# Patient Record
Sex: FEMALE | Race: BLACK OR AFRICAN AMERICAN | NOT HISPANIC OR LATINO | Employment: OTHER | URBAN - METROPOLITAN AREA
[De-identification: names, ages, dates, MRNs, and addresses within clinical notes are randomized per-mention and may not be internally consistent; named-entity substitution may affect disease eponyms.]

---

## 2020-04-17 ENCOUNTER — NURSE TRIAGE (OUTPATIENT)
Dept: ADMINISTRATIVE | Facility: CLINIC | Age: 60
End: 2020-04-17

## 2020-04-18 NOTE — TELEPHONE ENCOUNTER
Call back. Spoke with Ora. She is not with the pt. Trying to reach her. Requested RN call back in 10 minutes.

## 2020-04-18 NOTE — TELEPHONE ENCOUNTER
Pt's aunt states her niece had called her with extreme back pain. Shavonne is not with the pt right now. RN explained she would need to be with the pt to continue with triage. She VU. She will go to the pt's home . RN will wanda her back in 20 minutes.

## 2020-04-18 NOTE — TELEPHONE ENCOUNTER
"Pt reported general body pains. Worst pain shoulders. Pain has bee npresent for 1.5 years. Pain is severe tonight. Advised to go to ED for evaluation. Aunt Shavonne will bring her to the ED.    Reason for Disposition   [1] SEVERE pain AND [2] not improved 2 hours after pain medicine    Additional Information   Negative: Passed out (i.e., lost consciousness, collapsed and was not responding)   Negative: Shock suspected (e.g., cold/pale/clammy skin, too weak to stand, low BP, rapid pulse)   Negative: [1] Similar pain previously AND [2] it was from "heart attack"   Negative: [1] Similar pain previously AND [2] it was from "angina" AND [3] not relieved by nitroglycerin   Negative: Sounds like a life-threatening emergency to the triager   Negative: Difficulty breathing or unusual sweating (e.g., sweating without exertion)   Negative: [1] Pain lasting > 5 minutes AND [2] pain also present in chest (Exception: pain is clearly made worse by movement)   Negative: [1] Age > 40 AND [2] no obvious cause AND [3] pain even when not moving the arm    (Exception: pain is clearly made worse by moving arm or bending neck)    Protocols used: ST SHOULDER PAIN-A-AH    "

## 2021-09-10 ENCOUNTER — PATIENT OUTREACH (OUTPATIENT)
Dept: ADMINISTRATIVE | Facility: HOSPITAL | Age: 61
End: 2021-09-10

## 2021-09-10 DIAGNOSIS — Z12.12 ENCOUNTER FOR COLORECTAL CANCER SCREENING: ICD-10-CM

## 2021-09-10 DIAGNOSIS — Z12.4 ENCOUNTER FOR PAPANICOLAOU SMEAR OF CERVIX WITH HUMAN PAPILLOMA VIRUS (HPV) DNA COTESTING: Primary | ICD-10-CM

## 2021-09-10 DIAGNOSIS — Z12.11 ENCOUNTER FOR COLORECTAL CANCER SCREENING: ICD-10-CM

## 2023-02-08 DIAGNOSIS — Z12.31 OTHER SCREENING MAMMOGRAM: ICD-10-CM

## 2023-07-01 PROBLEM — Z86.73 HISTORY OF STROKE: Status: ACTIVE | Noted: 2023-07-01

## 2023-07-01 PROBLEM — I10 PRIMARY HYPERTENSION: Status: ACTIVE | Noted: 2023-07-01

## 2023-07-06 PROBLEM — E55.9 VITAMIN D INSUFFICIENCY: Status: ACTIVE | Noted: 2023-07-06

## 2023-07-12 PROBLEM — I63.9 CEREBRAL INFARCT: Status: ACTIVE | Noted: 2023-07-12

## 2023-07-27 PROBLEM — I63.9 STROKE: Status: ACTIVE | Noted: 2023-07-27

## 2023-07-27 PROBLEM — F10.931 DELIRIUM TREMENS: Status: ACTIVE | Noted: 2023-07-27

## 2023-07-28 PROBLEM — F10.90 ALCOHOL USE DISORDER: Status: ACTIVE | Noted: 2023-07-28

## 2023-07-28 PROBLEM — E86.0 DEHYDRATION: Status: ACTIVE | Noted: 2023-07-28

## 2023-07-28 PROBLEM — F25.0 SCHIZOAFFECTIVE DISORDER, BIPOLAR TYPE: Status: ACTIVE | Noted: 2023-07-28

## 2023-07-28 PROBLEM — E87.20 LACTIC ACIDOSIS: Status: ACTIVE | Noted: 2023-07-28

## 2023-07-28 PROBLEM — I25.10 CAD (CORONARY ARTERY DISEASE): Status: ACTIVE | Noted: 2023-07-28

## 2023-07-28 PROBLEM — J96.02 ACUTE RESPIRATORY FAILURE WITH HYPOXIA AND HYPERCARBIA: Status: ACTIVE | Noted: 2023-07-28

## 2023-07-28 PROBLEM — G40.901 STATUS EPILEPTICUS: Status: ACTIVE | Noted: 2023-07-28

## 2023-07-28 PROBLEM — I63.9 STROKE: Status: RESOLVED | Noted: 2023-07-27 | Resolved: 2023-07-28

## 2023-07-28 PROBLEM — I95.9 HYPOTENSION: Status: ACTIVE | Noted: 2023-07-28

## 2023-07-28 PROBLEM — I10 ESSENTIAL HYPERTENSION: Status: ACTIVE | Noted: 2023-07-28

## 2023-07-28 PROBLEM — E87.6 HYPOKALEMIA: Status: ACTIVE | Noted: 2023-07-28

## 2023-07-28 PROBLEM — J96.01 ACUTE RESPIRATORY FAILURE WITH HYPOXIA AND HYPERCARBIA: Status: ACTIVE | Noted: 2023-07-28

## 2023-07-28 PROBLEM — F17.200 NICOTINE USE DISORDER: Status: ACTIVE | Noted: 2023-07-28

## 2023-07-31 PROBLEM — R56.9 SEIZURES: Status: ACTIVE | Noted: 2023-07-28

## 2023-07-31 PROBLEM — G40.909 SEIZURE DISORDER: Status: ACTIVE | Noted: 2023-07-31

## 2023-07-31 PROBLEM — I63.81 THALAMIC INFARCT, ACUTE: Status: ACTIVE | Noted: 2023-07-31

## 2023-08-01 PROBLEM — I95.9 HYPOTENSION: Status: RESOLVED | Noted: 2023-07-28 | Resolved: 2023-08-01

## 2023-08-01 PROBLEM — E86.0 DEHYDRATION: Status: RESOLVED | Noted: 2023-07-28 | Resolved: 2023-08-01

## 2023-08-01 PROBLEM — F05 ACUTE HYPERACTIVE DELIRIUM DUE TO ANOTHER MEDICAL CONDITION: Status: ACTIVE | Noted: 2023-08-01

## 2023-08-01 PROBLEM — J96.02 ACUTE RESPIRATORY FAILURE WITH HYPOXIA AND HYPERCARBIA: Status: RESOLVED | Noted: 2023-07-28 | Resolved: 2023-08-01

## 2023-08-01 PROBLEM — I50.22 CHRONIC SYSTOLIC HEART FAILURE: Status: ACTIVE | Noted: 2023-08-01

## 2023-08-01 PROBLEM — J96.01 ACUTE RESPIRATORY FAILURE WITH HYPOXIA AND HYPERCARBIA: Status: RESOLVED | Noted: 2023-07-28 | Resolved: 2023-08-01

## 2023-08-01 PROBLEM — E87.6 HYPOKALEMIA: Status: RESOLVED | Noted: 2023-07-28 | Resolved: 2023-08-01

## 2023-08-01 PROBLEM — R94.31 PROLONGED Q-T INTERVAL ON ECG: Status: ACTIVE | Noted: 2023-08-01

## 2023-08-02 PROBLEM — E87.20 LACTIC ACIDOSIS: Status: RESOLVED | Noted: 2023-07-28 | Resolved: 2023-08-02

## 2023-08-04 PROBLEM — F10.931 DELIRIUM TREMENS: Status: RESOLVED | Noted: 2023-07-27 | Resolved: 2023-08-04

## 2023-08-05 PROBLEM — E83.39 HYPOPHOSPHATEMIA: Status: ACTIVE | Noted: 2023-08-05

## 2023-08-06 PROBLEM — E83.39 HYPOPHOSPHATEMIA: Status: RESOLVED | Noted: 2023-08-05 | Resolved: 2023-08-06

## 2023-08-08 PROBLEM — I21.4 NSTEMI (NON-ST ELEVATED MYOCARDIAL INFARCTION): Status: ACTIVE | Noted: 2023-08-08

## 2023-08-08 PROBLEM — I48.11 LONGSTANDING PERSISTENT ATRIAL FIBRILLATION: Status: ACTIVE | Noted: 2023-08-08

## 2023-08-08 PROBLEM — G40.909 SEIZURE DISORDER: Status: RESOLVED | Noted: 2023-07-31 | Resolved: 2023-08-08

## 2023-08-11 PROBLEM — R94.31 PROLONGED Q-T INTERVAL ON ECG: Status: RESOLVED | Noted: 2023-08-01 | Resolved: 2023-08-11

## 2023-08-11 PROBLEM — I25.10 CORONARY ARTERY DISEASE INVOLVING NATIVE CORONARY ARTERY OF NATIVE HEART WITHOUT ANGINA PECTORIS: Status: RESOLVED | Noted: 2023-07-28 | Resolved: 2023-08-11

## 2023-08-11 PROBLEM — F23 ACUTE PSYCHOSIS: Status: ACTIVE | Noted: 2023-08-11

## 2023-08-11 PROBLEM — F05 ACUTE HYPERACTIVE DELIRIUM DUE TO ANOTHER MEDICAL CONDITION: Status: RESOLVED | Noted: 2023-08-01 | Resolved: 2023-08-11

## 2023-08-12 PROBLEM — T14.8XXA MULTIPLE WOUNDS OF SKIN: Status: ACTIVE | Noted: 2023-08-12

## 2023-08-12 PROBLEM — R05.9 COUGH: Status: ACTIVE | Noted: 2023-08-12

## 2023-08-13 PROBLEM — R13.10 SWALLOWING DYSFUNCTION: Status: ACTIVE | Noted: 2023-08-13

## 2023-08-15 PROBLEM — F25.9 SCHIZOAFFECTIVE DISORDER: Status: ACTIVE | Noted: 2023-07-28

## 2023-08-15 PROBLEM — L81.9 HYPOPIGMENTATION: Status: ACTIVE | Noted: 2023-08-15

## 2023-08-15 PROBLEM — R05.9 COUGH: Status: RESOLVED | Noted: 2023-08-12 | Resolved: 2023-08-15

## 2023-08-22 PROBLEM — I21.09 ACUTE MI ANTERIOR WALL SUBSEQUENT EPISODE CARE: Status: ACTIVE | Noted: 2023-08-22

## 2023-08-22 PROBLEM — G40.901 STATUS EPILEPTICUS: Status: ACTIVE | Noted: 2023-08-22

## 2023-08-29 PROBLEM — D64.9 NORMOCYTIC ANEMIA: Status: ACTIVE | Noted: 2023-08-29

## 2023-08-29 PROBLEM — D50.9 IRON DEFICIENCY ANEMIA: Status: ACTIVE | Noted: 2023-08-29

## 2023-09-14 PROBLEM — R94.31 ABNORMAL EKG: Status: ACTIVE | Noted: 2023-09-14

## 2023-09-14 PROBLEM — R79.89 ELEVATED TROPONIN LEVEL: Status: ACTIVE | Noted: 2023-09-14

## 2023-09-16 PROBLEM — I21.4 NSTEMI (NON-ST ELEVATED MYOCARDIAL INFARCTION): Status: RESOLVED | Noted: 2023-08-08 | Resolved: 2023-09-16

## 2023-09-16 PROBLEM — R94.31 ABNORMAL EKG: Status: RESOLVED | Noted: 2023-09-14 | Resolved: 2023-09-16

## 2023-09-16 PROBLEM — R79.89 ELEVATED TROPONIN LEVEL: Status: RESOLVED | Noted: 2023-09-14 | Resolved: 2023-09-16

## 2023-09-18 PROBLEM — F20.9 SCHIZOPHRENIA: Status: ACTIVE | Noted: 2023-09-18

## 2023-09-18 PROBLEM — G40.919 BREAKTHROUGH SEIZURE: Status: ACTIVE | Noted: 2023-07-28

## 2023-09-18 PROBLEM — F10.10 ALCOHOL ABUSE: Status: ACTIVE | Noted: 2023-09-18

## 2023-09-18 PROBLEM — I10 HYPERTENSION: Status: ACTIVE | Noted: 2023-09-18

## 2023-09-18 PROBLEM — I50.9 HEART FAILURE: Status: ACTIVE | Noted: 2023-09-18

## 2023-09-18 PROBLEM — E11.9 TYPE 2 DIABETES MELLITUS WITHOUT COMPLICATION: Status: ACTIVE | Noted: 2023-09-18

## 2023-09-19 PROBLEM — I50.9 HEART FAILURE: Status: RESOLVED | Noted: 2023-09-18 | Resolved: 2023-09-19

## 2023-09-19 PROBLEM — I10 HYPERTENSION: Status: RESOLVED | Noted: 2023-09-18 | Resolved: 2023-09-19

## 2023-09-19 PROBLEM — R50.9 FEVER: Status: ACTIVE | Noted: 2023-09-19

## 2023-09-20 PROBLEM — G93.40 ENCEPHALOPATHY: Status: ACTIVE | Noted: 2023-09-20

## 2023-09-23 PROBLEM — G40.909 EPILEPSY: Status: ACTIVE | Noted: 2023-07-28

## 2023-09-23 PROBLEM — G93.40 ENCEPHALOPATHY: Status: RESOLVED | Noted: 2023-09-20 | Resolved: 2023-09-23

## 2023-09-23 PROBLEM — R56.9 SEIZURE: Status: ACTIVE | Noted: 2023-09-23

## 2023-09-26 PROBLEM — R13.19 OTHER DYSPHAGIA: Status: ACTIVE | Noted: 2023-09-26

## 2023-09-27 PROBLEM — F10.10 ALCOHOL ABUSE: Status: RESOLVED | Noted: 2023-09-18 | Resolved: 2023-09-27

## 2023-09-27 PROBLEM — R50.9 FEVER: Status: RESOLVED | Noted: 2023-09-19 | Resolved: 2023-09-27

## 2023-10-03 PROBLEM — R00.1 BRADYCARDIA: Status: ACTIVE | Noted: 2023-10-03

## 2023-10-03 PROBLEM — R63.39 FEEDING DIFFICULTY IN ADULT: Status: ACTIVE | Noted: 2023-10-03

## 2023-10-04 PROBLEM — D75.839 THROMBOCYTOSIS: Status: ACTIVE | Noted: 2023-10-04

## 2023-10-05 PROBLEM — R53.81 DEBILITY: Status: ACTIVE | Noted: 2023-10-05

## 2023-10-06 PROBLEM — E16.2 HYPOGLYCEMIA: Status: ACTIVE | Noted: 2023-10-06

## 2023-10-08 PROBLEM — R00.1 BRADYCARDIA: Status: RESOLVED | Noted: 2023-10-03 | Resolved: 2023-10-08

## 2023-10-10 PROBLEM — E11.9 TYPE 2 DIABETES MELLITUS WITHOUT COMPLICATION: Status: RESOLVED | Noted: 2023-09-18 | Resolved: 2023-10-10

## 2023-10-11 PROBLEM — E16.2 HYPOGLYCEMIA: Status: RESOLVED | Noted: 2023-10-06 | Resolved: 2023-10-11

## 2023-10-19 ENCOUNTER — PATIENT OUTREACH (OUTPATIENT)
Dept: ADMINISTRATIVE | Facility: CLINIC | Age: 63
End: 2023-10-19

## 2023-10-19 NOTE — PROGRESS NOTES
C3 nurse attempted to contact patient for a TCC post hospital discharge follow-up call. The patient's daughter Ela declined call at this time.

## 2023-11-13 PROBLEM — I63.9 CEREBROVASCULAR ACCIDENT (CVA): Status: RESOLVED | Noted: 2023-07-27 | Resolved: 2023-11-13

## 2023-11-27 PROBLEM — I21.09 ACUTE MI ANTERIOR WALL SUBSEQUENT EPISODE CARE: Status: RESOLVED | Noted: 2023-08-22 | Resolved: 2023-11-27
